# Patient Record
Sex: MALE | Race: WHITE | NOT HISPANIC OR LATINO | Employment: FULL TIME | URBAN - METROPOLITAN AREA
[De-identification: names, ages, dates, MRNs, and addresses within clinical notes are randomized per-mention and may not be internally consistent; named-entity substitution may affect disease eponyms.]

---

## 2017-08-26 ENCOUNTER — HOSPITAL ENCOUNTER (EMERGENCY)
Facility: HOSPITAL | Age: 52
Discharge: HOME | End: 2017-08-26
Attending: EMERGENCY MEDICINE
Payer: OTHER GOVERNMENT

## 2017-08-26 VITALS
RESPIRATION RATE: 18 BRPM | BODY MASS INDEX: 22.68 KG/M2 | DIASTOLIC BLOOD PRESSURE: 57 MMHG | WEIGHT: 162 LBS | TEMPERATURE: 98 F | OXYGEN SATURATION: 98 % | HEIGHT: 71 IN | SYSTOLIC BLOOD PRESSURE: 100 MMHG | HEART RATE: 59 BPM

## 2017-08-26 DIAGNOSIS — S29.8XXA BLUNT TRAUMA TO CHEST, INITIAL ENCOUNTER: ICD-10-CM

## 2017-08-26 DIAGNOSIS — S20.212A CONTUSION OF LEFT FRONT WALL OF THORAX, INITIAL ENCOUNTER: Primary | ICD-10-CM

## 2017-08-26 PROCEDURE — 63600175 PHARM REV CODE 636 W HCPCS: Performed by: EMERGENCY MEDICINE

## 2017-08-26 PROCEDURE — 99284 EMERGENCY DEPT VISIT MOD MDM: CPT | Mod: 25

## 2017-08-26 PROCEDURE — 25000003 PHARM REV CODE 250: Performed by: EMERGENCY MEDICINE

## 2017-08-26 PROCEDURE — 96372 THER/PROPH/DIAG INJ SC/IM: CPT

## 2017-08-26 RX ORDER — OXYCODONE AND ACETAMINOPHEN 10; 325 MG/1; MG/1
1 TABLET ORAL EVERY 6 HOURS PRN
Qty: 15 TABLET | Refills: 0 | Status: SHIPPED | OUTPATIENT
Start: 2017-08-26

## 2017-08-26 RX ORDER — HYDROMORPHONE HYDROCHLORIDE 2 MG/ML
1 INJECTION, SOLUTION INTRAMUSCULAR; INTRAVENOUS; SUBCUTANEOUS
Status: COMPLETED | OUTPATIENT
Start: 2017-08-26 | End: 2017-08-26

## 2017-08-26 RX ORDER — ONDANSETRON 4 MG/1
4 TABLET, ORALLY DISINTEGRATING ORAL
Status: COMPLETED | OUTPATIENT
Start: 2017-08-26 | End: 2017-08-26

## 2017-08-26 RX ADMIN — ONDANSETRON 4 MG: 4 TABLET, ORALLY DISINTEGRATING ORAL at 08:08

## 2017-08-26 RX ADMIN — HYDROMORPHONE HYDROCHLORIDE 1 MG: 2 INJECTION INTRAMUSCULAR; INTRAVENOUS; SUBCUTANEOUS at 08:08

## 2017-08-26 NOTE — DISCHARGE INSTRUCTIONS
Use a heating pad.  Rest.  Medicines as above.  Please follow-up with primary care doctor this week.  Return for shortness of breath or fever.

## 2017-08-26 NOTE — ED PROVIDER NOTES
"Encounter Date: 8/26/2017    SCRIBE #1 NOTE: I, Kimberly Timmons, am scribing for, and in the presence of,  Fito Gongora MD. I have scribed the following portions of the note - Other sections scribed: HPI and ROS.       History     Chief Complaint   Patient presents with    Fall     arrived via [facility], called to home for c/o L rib pain, EMS reports pt fell 1 wk ago, trip and fell on stools, neg LOC, reports seen at urgent care and no xrays done, reports pain worsening last night upon bending over     CC: Fall    HPI: This 52 y.o. Male, smoker, with medical history of diabetes mellitus presents to the ED via EMS transportation s/p a mechanical fall that occurred on Sunday (8/20/17) night. Pt reports that he tripped on tools in his garage causing him to fall and land on his left side. He presently c/o left sided flank pain (near rib cage). Symptoms are acute, constant and severe (10/10). [facility] and notes that he did not have an x-ray preformed at the time, but was prescribed Vicodine. Pt states that the symptoms worsened last night after reaching to  a drink, noting that he began to experience a "stabbing" pain to the area which has since been constant. He reports that he took Vicodine before going to bed, but awoke at 3:00 am this morning with worsened pain, prompting him to take the medication again. Pt states that he later became nauseated and felt as though he was going to pass out. Pt denies fever, chills, headache, ear pain, sore throat, cough, emesis, extremity problem and rash. No other associated symptoms.           The history is provided by the patient. No  was used.     Review of patient's allergies indicates:   Allergen Reactions    Iodine and iodide containing products Hives and Itching     Past Medical History:   Diagnosis Date    Diabetes mellitus      No past surgical history on file.  No family history on " file.  Social History   Substance Use Topics    Smoking status: Current Every Day Smoker     Packs/day: 1.00    Smokeless tobacco: Not on file    Alcohol use 3.6 oz/week     6 Cans of beer per week     Review of Systems   Constitutional: Negative for chills and fever.   HENT: Negative for ear pain, rhinorrhea and sore throat.    Eyes: Negative for redness.   Respiratory: Negative for cough.    Cardiovascular: Negative for chest pain.   Gastrointestinal: Positive for nausea. Negative for abdominal pain, diarrhea and vomiting.   Genitourinary: Positive for flank pain (left (near rib cage)). Negative for dysuria.   Musculoskeletal: Negative for back pain.   Skin: Negative for color change and rash.   Neurological: Negative for syncope, weakness and headaches.   Psychiatric/Behavioral: The patient is not nervous/anxious.        Physical Exam     Initial Vitals [08/26/17 0728]   BP Pulse Resp Temp SpO2   120/67 (!) 54 16 97.8 °F (36.6 °C) 97 %      MAP       84.67         Physical Exam  The patient was examined specifically for the following:   General:No significant distress, Good color, Warm and dry. Head and neck:Scalp atraumatic, Neck supple. Neurological:Appropriate conversation, Gross motor deficits. Eyes:Conjugate gaze, Clear corneas. ENT: No epistaxis. Cardiac: Regular rate and rhythm, Grossly normal heart tones. Pulmonary: Wheezing, Rales. Gastrointestinal: Abdominal tenderness, Abdominal distention. Musculoskeletal: Extremity deformity, Apparent pain with range of motion of the joints. Skin: Rash.   The findings on examination were normal except for the following: The patient has exquisite tenderness of the left lateral thorax.  The lungs are remarkable for scattered wheezing bilaterally.  The abdomen is nontender.  There is no costovertebral angle tenderness.  ED Course   Procedures  Labs Reviewed - No data to display       X-Rays:   Independently Interpreted Readings:   Other Readings:  Chest x-ray fails  to reveal pneumothorax pneumonia pleural effusion.  There is no hemothorax.    Medical decision making: This patient presents emergency with severe pain in his left lateral chest after falling on it.  He is been treated with hydrocodone.  Is not really working.  The patient has a history of diabetes.  I will not use steroids.  I'm concerned about using nonsteroidals.  I will treat him with Percocet and have him follow-up with primary care.  I believe she will recover.  Should rib remains in the differential.             Scribe Attestation:   Scribe #1: I performed the above scribed service and the documentation accurately describes the services I performed. I attest to the accuracy of the note.    Attending Attestation:           Physician Attestation for Scribe:  Physician Attestation Statement for Scribe #1: I, Fito Gongora MD, reviewed documentation, as scribed by Kimberly Timmons in my presence, and it is both accurate and complete.                 ED Course     Clinical Impression:   The primary encounter diagnosis was Contusion of left front wall of thorax, initial encounter. A diagnosis of Blunt trauma to chest, initial encounter was also pertinent to this visit.                           Fito Gongora MD  08/26/17 [id number]

## 2017-08-26 NOTE — ED TRIAGE NOTES
53 yo male brought in by ems. Pt aox3 with the cc of left side rib pain. Pt fell 7 days injuring his ribs, pt states pain was at a min, until he bemt over and to pick something up.  Pt states the pain is emense.